# Patient Record
Sex: MALE | Race: OTHER | HISPANIC OR LATINO | URBAN - METROPOLITAN AREA
[De-identification: names, ages, dates, MRNs, and addresses within clinical notes are randomized per-mention and may not be internally consistent; named-entity substitution may affect disease eponyms.]

---

## 2019-02-08 ENCOUNTER — EMERGENCY (EMERGENCY)
Facility: HOSPITAL | Age: 29
LOS: 0 days | Discharge: HOME | End: 2019-02-08
Attending: EMERGENCY MEDICINE | Admitting: EMERGENCY MEDICINE
Payer: SELF-PAY

## 2019-02-08 VITALS
OXYGEN SATURATION: 100 % | HEART RATE: 59 BPM | DIASTOLIC BLOOD PRESSURE: 61 MMHG | RESPIRATION RATE: 18 BRPM | TEMPERATURE: 98 F | SYSTOLIC BLOOD PRESSURE: 129 MMHG

## 2019-02-08 VITALS — HEIGHT: 71 IN | WEIGHT: 190.04 LBS
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
## 2019-02-08 DIAGNOSIS — S39.012A STRAIN OF MUSCLE, FASCIA AND TENDON OF LOWER BACK, INITIAL ENCOUNTER: ICD-10-CM

## 2019-02-08 DIAGNOSIS — G89.11 ACUTE PAIN DUE TO TRAUMA: ICD-10-CM

## 2019-02-08 DIAGNOSIS — M25.512 PAIN IN LEFT SHOULDER: ICD-10-CM

## 2019-02-08 PROCEDURE — 99283 EMERGENCY DEPT VISIT LOW MDM: CPT

## 2019-02-08 RX ORDER — IBUPROFEN 200 MG
600 TABLET ORAL ONCE
Qty: 0 | Refills: 0 | Status: COMPLETED | OUTPATIENT
Start: 2019-02-08 | End: 2019-02-08

## 2019-02-08 RX ORDER — METHOCARBAMOL 500 MG/1
1 TABLET, FILM COATED ORAL
Qty: 12 | Refills: 0
Start: 2019-02-08 | End: 2019-02-11

## 2019-02-08 RX ORDER — IBUPROFEN 200 MG
1 TABLET ORAL
Qty: 12 | Refills: 0
Start: 2019-02-08 | End: 2019-02-10

## 2019-02-08 RX ORDER — METHOCARBAMOL 500 MG/1
500 TABLET, FILM COATED ORAL ONCE
Qty: 0 | Refills: 0 | Status: COMPLETED | OUTPATIENT
Start: 2019-02-08 | End: 2019-02-08

## 2019-02-08 RX ADMIN — Medication 600 MILLIGRAM(S): at 15:51

## 2019-02-08 RX ADMIN — METHOCARBAMOL 500 MILLIGRAM(S): 500 TABLET, FILM COATED ORAL at 15:51

## 2019-02-08 NOTE — ED STATDOCS - NS_ ATTENDINGSCRIBEDETAILS _ED_A_ED_FT
I, Edwin Parham MD,  performed the initial face to face bedside interview with this patient regarding history of present illness, review of symptoms and relevant past medical, social and family history.  I completed an independent physical examination.    The history, relevant review of systems, past medical and surgical history, medical decision making, and physical examination was documented by the scribe in my presence and I attest to the accuracy of the documentation.
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
## 2019-02-08 NOTE — ED STATDOCS - ATTENDING CONTRIBUTION TO CARE
I, Edwin Parham MD,  performed the initial face to face bedside interview with this patient regarding history of present illness, review of symptoms and relevant past medical, social and family history.  I completed an independent physical examination.  I was the initial provider who evaluated this patient. I have signed out the follow up of any pending tests (i.e. labs, radiological studies) to the ACP.  I have communicated the patient’s plan of care and disposition with the ACP.

## 2019-05-11 ENCOUNTER — EMERGENCY (EMERGENCY)
Facility: HOSPITAL | Age: 29
LOS: 0 days | Discharge: HOME | End: 2019-05-12
Attending: EMERGENCY MEDICINE | Admitting: EMERGENCY MEDICINE
Payer: MEDICAID

## 2019-05-11 VITALS
RESPIRATION RATE: 16 BRPM | DIASTOLIC BLOOD PRESSURE: 50 MMHG | HEART RATE: 51 BPM | TEMPERATURE: 98 F | SYSTOLIC BLOOD PRESSURE: 112 MMHG | OXYGEN SATURATION: 98 %

## 2019-05-11 DIAGNOSIS — R11.0 NAUSEA: ICD-10-CM

## 2019-05-11 DIAGNOSIS — R10.31 RIGHT LOWER QUADRANT PAIN: ICD-10-CM

## 2019-05-11 DIAGNOSIS — N50.819 TESTICULAR PAIN, UNSPECIFIED: ICD-10-CM

## 2019-05-11 PROCEDURE — 99284 EMERGENCY DEPT VISIT MOD MDM: CPT

## 2019-05-11 RX ORDER — ONDANSETRON 8 MG/1
4 TABLET, FILM COATED ORAL ONCE
Refills: 0 | Status: COMPLETED | OUTPATIENT
Start: 2019-05-11 | End: 2019-05-11

## 2019-05-11 RX ORDER — SODIUM CHLORIDE 9 MG/ML
1000 INJECTION INTRAMUSCULAR; INTRAVENOUS; SUBCUTANEOUS ONCE
Refills: 0 | Status: COMPLETED | OUTPATIENT
Start: 2019-05-11 | End: 2019-05-11

## 2019-05-11 RX ORDER — MORPHINE SULFATE 50 MG/1
2 CAPSULE, EXTENDED RELEASE ORAL ONCE
Refills: 0 | Status: DISCONTINUED | OUTPATIENT
Start: 2019-05-11 | End: 2019-05-11

## 2019-05-11 RX ADMIN — SODIUM CHLORIDE 1000 MILLILITER(S): 9 INJECTION INTRAMUSCULAR; INTRAVENOUS; SUBCUTANEOUS at 23:59

## 2019-05-11 NOTE — ED PROVIDER NOTE - NSFOLLOWUPINSTRUCTIONS_ED_ALL_ED_FT
Follow up with [facility] [phone]  Follow up with surgery for possible inguinal hernia   Avoid heavy lifting as much as possible  Buy a hernia belt for support  Return to ED for increased pain, fever, vomiting, or other concerns    Hernia    A hernia is when fat or the intestines push through a weak area in the abdominal wall. This can occur around the belly button (umbilical hernia) or where the leg meets the lower abdomen (inguinal hernia). This creates a rounded lump (bulge). Hernias that can be pushed back into the belly are called reducible and those that cannot are called incarcerated. Hernias that are not reducible and lose their blood supply are called strangulated and require emergency surgery. Hernias can be caused or worsened when straining during bowel movements or lifting heavy objects as well as coughing or sneezing. Surgery may be helpful in treating this condition so follow up with a surgeon.    SEEK IMMEDIATE MEDICAL CARE IF YOU HAVE ANY OF THE FOLLOWING SYMPTOMS: worsening abdominal pain, change in color over the hernia, nausea/vomiting, or inability to have a bowel movement or pass gas.

## 2019-05-11 NOTE — ED PROVIDER NOTE - GASTROINTESTINAL, MLM
Abdomen soft, right lower abdominal tenderness.  Mild fullness right inguinal region without discrete hernia palpable.  No r/g

## 2019-05-11 NOTE — ED PROVIDER NOTE - OBJECTIVE STATEMENT
27 yo male with no pmh c/o abdominal pain.  Pt c/o rlq pain, mild 4 days, barely noticable.  today with severe pain, nausea without vomiting.  Pain with urination and difficulty starting the stream.  Pain radiates to the back and the testicle at times.  Noticed some swelling in the right inguinal region but not in the testicle.  Does a lot of heavy lifting at work.  nonsmoker, Nondrinker.  No surgical hx.  denies h/o sTI and denies any exposures.

## 2019-05-11 NOTE — ED PROVIDER NOTE - CARE PROVIDER_API CALL
Mic Rahman)  Surgery  [address], [address]  [place] [address]  Phone: [phone]  Fax: [phone]  Follow Up Time:

## 2019-05-11 NOTE — ED PROVIDER NOTE - GENITOURINARY, MLM
No discharge, lesions; uncircumcised.  No testicular swelling or tenderness on exam. +cremasteric reflex bl

## 2019-05-12 VITALS
TEMPERATURE: 98 F | SYSTOLIC BLOOD PRESSURE: 120 MMHG | OXYGEN SATURATION: 99 % | RESPIRATION RATE: 18 BRPM | HEART RATE: 60 BPM | DIASTOLIC BLOOD PRESSURE: 48 MMHG

## 2019-05-12 LAB
ABO RH CONFIRMATION: SIGNIFICANT CHANGE UP
ALBUMIN SERPL ELPH-MCNC: 3.5 G/DL — SIGNIFICANT CHANGE UP (ref 3.3–5)
ALP SERPL-CCNC: 85 U/L — SIGNIFICANT CHANGE UP (ref 40–120)
ALT FLD-CCNC: 30 U/L — SIGNIFICANT CHANGE UP (ref 12–78)
ANION GAP SERPL CALC-SCNC: 5 MMOL/L — SIGNIFICANT CHANGE UP (ref 5–17)
APPEARANCE UR: ABNORMAL
APTT BLD: 31.9 SEC — SIGNIFICANT CHANGE UP (ref 27.5–36.3)
AST SERPL-CCNC: 30 U/L — SIGNIFICANT CHANGE UP (ref 15–37)
BACTERIA # UR AUTO: ABNORMAL
BASOPHILS # BLD AUTO: 0.06 K/UL — SIGNIFICANT CHANGE UP (ref 0–0.2)
BASOPHILS NFR BLD AUTO: 1 % — SIGNIFICANT CHANGE UP (ref 0–2)
BILIRUB SERPL-MCNC: 0.4 MG/DL — SIGNIFICANT CHANGE UP (ref 0.2–1.2)
BILIRUB UR-MCNC: NEGATIVE — SIGNIFICANT CHANGE UP
BLD GP AB SCN SERPL QL: SIGNIFICANT CHANGE UP
BUN SERPL-MCNC: 11 MG/DL — SIGNIFICANT CHANGE UP (ref 7–23)
CALCIUM SERPL-MCNC: 8.9 MG/DL — SIGNIFICANT CHANGE UP (ref 8.5–10.1)
CHLORIDE SERPL-SCNC: 108 MMOL/L — SIGNIFICANT CHANGE UP (ref 96–108)
CO2 SERPL-SCNC: 27 MMOL/L — SIGNIFICANT CHANGE UP (ref 22–31)
COLOR SPEC: YELLOW — SIGNIFICANT CHANGE UP
COMMENT - URINE: SIGNIFICANT CHANGE UP
CREAT SERPL-MCNC: 0.97 MG/DL — SIGNIFICANT CHANGE UP (ref 0.5–1.3)
DIFF PNL FLD: NEGATIVE — SIGNIFICANT CHANGE UP
EOSINOPHIL # BLD AUTO: 0.19 K/UL — SIGNIFICANT CHANGE UP (ref 0–0.5)
EOSINOPHIL NFR BLD AUTO: 3.2 % — SIGNIFICANT CHANGE UP (ref 0–6)
EPI CELLS # UR: SIGNIFICANT CHANGE UP
GLUCOSE SERPL-MCNC: 102 MG/DL — HIGH (ref 70–99)
GLUCOSE UR QL: NEGATIVE MG/DL — SIGNIFICANT CHANGE UP
HCT VFR BLD CALC: 39 % — SIGNIFICANT CHANGE UP (ref 39–50)
HGB BLD-MCNC: 13.4 G/DL — SIGNIFICANT CHANGE UP (ref 13–17)
IMM GRANULOCYTES NFR BLD AUTO: 0.2 % — SIGNIFICANT CHANGE UP (ref 0–1.5)
INR BLD: 1.09 RATIO — SIGNIFICANT CHANGE UP (ref 0.88–1.16)
KETONES UR-MCNC: ABNORMAL
LEUKOCYTE ESTERASE UR-ACNC: NEGATIVE — SIGNIFICANT CHANGE UP
LIDOCAIN IGE QN: 111 U/L — SIGNIFICANT CHANGE UP (ref 73–393)
LYMPHOCYTES # BLD AUTO: 2.51 K/UL — SIGNIFICANT CHANGE UP (ref 1–3.3)
LYMPHOCYTES # BLD AUTO: 42.6 % — SIGNIFICANT CHANGE UP (ref 13–44)
MCHC RBC-ENTMCNC: 30.8 PG — SIGNIFICANT CHANGE UP (ref 27–34)
MCHC RBC-ENTMCNC: 34.4 GM/DL — SIGNIFICANT CHANGE UP (ref 32–36)
MCV RBC AUTO: 89.7 FL — SIGNIFICANT CHANGE UP (ref 80–100)
MONOCYTES # BLD AUTO: 0.49 K/UL — SIGNIFICANT CHANGE UP (ref 0–0.9)
MONOCYTES NFR BLD AUTO: 8.3 % — SIGNIFICANT CHANGE UP (ref 2–14)
NEUTROPHILS # BLD AUTO: 2.63 K/UL — SIGNIFICANT CHANGE UP (ref 1.8–7.4)
NEUTROPHILS NFR BLD AUTO: 44.7 % — SIGNIFICANT CHANGE UP (ref 43–77)
NITRITE UR-MCNC: NEGATIVE — SIGNIFICANT CHANGE UP
NRBC # BLD: 0 /100 WBCS — SIGNIFICANT CHANGE UP (ref 0–0)
PH UR: 8 — SIGNIFICANT CHANGE UP (ref 5–8)
PLATELET # BLD AUTO: 250 K/UL — SIGNIFICANT CHANGE UP (ref 150–400)
POTASSIUM SERPL-MCNC: 3.9 MMOL/L — SIGNIFICANT CHANGE UP (ref 3.5–5.3)
POTASSIUM SERPL-SCNC: 3.9 MMOL/L — SIGNIFICANT CHANGE UP (ref 3.5–5.3)
PROT SERPL-MCNC: 6.9 GM/DL — SIGNIFICANT CHANGE UP (ref 6–8.3)
PROT UR-MCNC: NEGATIVE MG/DL — SIGNIFICANT CHANGE UP
PROTHROM AB SERPL-ACNC: 12.1 SEC — SIGNIFICANT CHANGE UP (ref 10–12.9)
RBC # BLD: 4.35 M/UL — SIGNIFICANT CHANGE UP (ref 4.2–5.8)
RBC # FLD: 12.7 % — SIGNIFICANT CHANGE UP (ref 10.3–14.5)
RBC CASTS # UR COMP ASSIST: SIGNIFICANT CHANGE UP /HPF (ref 0–4)
SODIUM SERPL-SCNC: 140 MMOL/L — SIGNIFICANT CHANGE UP (ref 135–145)
SP GR SPEC: 1.01 — SIGNIFICANT CHANGE UP (ref 1.01–1.02)
TYPE + AB SCN PNL BLD: SIGNIFICANT CHANGE UP
UROBILINOGEN FLD QL: NEGATIVE MG/DL — SIGNIFICANT CHANGE UP
WBC # BLD: 5.89 K/UL — SIGNIFICANT CHANGE UP (ref 3.8–10.5)
WBC # FLD AUTO: 5.89 K/UL — SIGNIFICANT CHANGE UP (ref 3.8–10.5)
WBC UR QL: SIGNIFICANT CHANGE UP

## 2019-05-12 PROCEDURE — 74177 CT ABD & PELVIS W/CONTRAST: CPT | Mod: 26

## 2019-05-12 RX ORDER — MORPHINE SULFATE 50 MG/1
4 CAPSULE, EXTENDED RELEASE ORAL ONCE
Refills: 0 | Status: DISCONTINUED | OUTPATIENT
Start: 2019-05-12 | End: 2019-05-12

## 2019-05-12 RX ADMIN — ONDANSETRON 4 MILLIGRAM(S): 8 TABLET, FILM COATED ORAL at 00:06

## 2019-05-12 RX ADMIN — MORPHINE SULFATE 4 MILLIGRAM(S): 50 CAPSULE, EXTENDED RELEASE ORAL at 02:49

## 2019-05-12 RX ADMIN — MORPHINE SULFATE 2 MILLIGRAM(S): 50 CAPSULE, EXTENDED RELEASE ORAL at 00:06

## 2019-05-12 RX ADMIN — SODIUM CHLORIDE 1000 MILLILITER(S): 9 INJECTION INTRAMUSCULAR; INTRAVENOUS; SUBCUTANEOUS at 01:01

## 2019-05-12 NOTE — ED ADULT NURSE NOTE - NSIMPLEMENTINTERV_GEN_ALL_ED
Implemented All Universal Safety Interventions:  [place] to call system. Call bell, personal items and telephone within reach. Instruct patient to call for assistance. Room bathroom lighting operational. Non-slip footwear when patient is off stretcher. Physically safe environment: no spills, clutter or unnecessary equipment. Stretcher in lowest position, wheels locked, appropriate side rails in place.

## 2019-05-12 NOTE — ED ADULT NURSE REASSESSMENT NOTE - NS ED NURSE REASSESS COMMENT FT1
pt states groin pain/abd pain improved s/p morphine, now with abd pain again but has become more diffuse- denies n/v Awaiting CT results. Will monitor.

## 2019-05-13 LAB
N GONORRHOEA RRNA SPEC QL NAA+PROBE: SIGNIFICANT CHANGE UP
SPECIMEN SOURCE: SIGNIFICANT CHANGE UP

## 2019-06-11 ENCOUNTER — TRANSCRIPTION ENCOUNTER (OUTPATIENT)
Age: 29
End: 2019-06-11

## 2019-06-11 ENCOUNTER — INPATIENT (INPATIENT)
Facility: HOSPITAL | Age: 29
LOS: 0 days | Discharge: HOME | End: 2019-06-12
Attending: SURGERY | Admitting: SURGERY
Payer: MEDICAID

## 2019-06-11 VITALS
OXYGEN SATURATION: 100 % | DIASTOLIC BLOOD PRESSURE: 87 MMHG | SYSTOLIC BLOOD PRESSURE: 136 MMHG | TEMPERATURE: 98 F | HEART RATE: 69 BPM | HEIGHT: 71 IN | WEIGHT: 195.11 LBS | RESPIRATION RATE: 18 BRPM

## 2019-06-11 DIAGNOSIS — K37 UNSPECIFIED APPENDICITIS: ICD-10-CM

## 2019-06-11 LAB
ALBUMIN SERPL ELPH-MCNC: 4.1 G/DL — SIGNIFICANT CHANGE UP (ref 3.3–5)
ALP SERPL-CCNC: 79 U/L — SIGNIFICANT CHANGE UP (ref 40–120)
ALT FLD-CCNC: 19 U/L — SIGNIFICANT CHANGE UP (ref 12–78)
ANION GAP SERPL CALC-SCNC: 5 MMOL/L — SIGNIFICANT CHANGE UP (ref 5–17)
APTT BLD: 29 SEC — SIGNIFICANT CHANGE UP (ref 27.5–36.3)
AST SERPL-CCNC: 19 U/L — SIGNIFICANT CHANGE UP (ref 15–37)
BASOPHILS # BLD AUTO: 0.05 K/UL — SIGNIFICANT CHANGE UP (ref 0–0.2)
BASOPHILS NFR BLD AUTO: 0.4 % — SIGNIFICANT CHANGE UP (ref 0–2)
BILIRUB SERPL-MCNC: 0.5 MG/DL — SIGNIFICANT CHANGE UP (ref 0.2–1.2)
BLD GP AB SCN SERPL QL: SIGNIFICANT CHANGE UP
BUN SERPL-MCNC: 14 MG/DL — SIGNIFICANT CHANGE UP (ref 7–23)
CALCIUM SERPL-MCNC: 9.4 MG/DL — SIGNIFICANT CHANGE UP (ref 8.5–10.1)
CHLORIDE SERPL-SCNC: 106 MMOL/L — SIGNIFICANT CHANGE UP (ref 96–108)
CO2 SERPL-SCNC: 29 MMOL/L — SIGNIFICANT CHANGE UP (ref 22–31)
CREAT SERPL-MCNC: 1.06 MG/DL — SIGNIFICANT CHANGE UP (ref 0.5–1.3)
EOSINOPHIL # BLD AUTO: 0.24 K/UL — SIGNIFICANT CHANGE UP (ref 0–0.5)
EOSINOPHIL NFR BLD AUTO: 2.1 % — SIGNIFICANT CHANGE UP (ref 0–6)
GLUCOSE SERPL-MCNC: 97 MG/DL — SIGNIFICANT CHANGE UP (ref 70–99)
HCT VFR BLD CALC: 44.8 % — SIGNIFICANT CHANGE UP (ref 39–50)
HGB BLD-MCNC: 15.1 G/DL — SIGNIFICANT CHANGE UP (ref 13–17)
IMM GRANULOCYTES NFR BLD AUTO: 0.3 % — SIGNIFICANT CHANGE UP (ref 0–1.5)
INR BLD: 1.07 RATIO — SIGNIFICANT CHANGE UP (ref 0.88–1.16)
LYMPHOCYTES # BLD AUTO: 1.38 K/UL — SIGNIFICANT CHANGE UP (ref 1–3.3)
LYMPHOCYTES # BLD AUTO: 11.8 % — LOW (ref 13–44)
MCHC RBC-ENTMCNC: 30.6 PG — SIGNIFICANT CHANGE UP (ref 27–34)
MCHC RBC-ENTMCNC: 33.7 GM/DL — SIGNIFICANT CHANGE UP (ref 32–36)
MCV RBC AUTO: 90.9 FL — SIGNIFICANT CHANGE UP (ref 80–100)
MONOCYTES # BLD AUTO: 0.94 K/UL — HIGH (ref 0–0.9)
MONOCYTES NFR BLD AUTO: 8 % — SIGNIFICANT CHANGE UP (ref 2–14)
NEUTROPHILS # BLD AUTO: 9.05 K/UL — HIGH (ref 1.8–7.4)
NEUTROPHILS NFR BLD AUTO: 77.4 % — HIGH (ref 43–77)
PLATELET # BLD AUTO: 243 K/UL — SIGNIFICANT CHANGE UP (ref 150–400)
POTASSIUM SERPL-MCNC: 3.8 MMOL/L — SIGNIFICANT CHANGE UP (ref 3.5–5.3)
POTASSIUM SERPL-SCNC: 3.8 MMOL/L — SIGNIFICANT CHANGE UP (ref 3.5–5.3)
PROT SERPL-MCNC: 7.9 GM/DL — SIGNIFICANT CHANGE UP (ref 6–8.3)
PROTHROM AB SERPL-ACNC: 11.9 SEC — SIGNIFICANT CHANGE UP (ref 10–12.9)
RBC # BLD: 4.93 M/UL — SIGNIFICANT CHANGE UP (ref 4.2–5.8)
RBC # FLD: 12.4 % — SIGNIFICANT CHANGE UP (ref 10.3–14.5)
SODIUM SERPL-SCNC: 140 MMOL/L — SIGNIFICANT CHANGE UP (ref 135–145)
TYPE + AB SCN PNL BLD: SIGNIFICANT CHANGE UP
WBC # BLD: 11.7 K/UL — HIGH (ref 3.8–10.5)
WBC # FLD AUTO: 11.7 K/UL — HIGH (ref 3.8–10.5)

## 2019-06-11 PROCEDURE — 99285 EMERGENCY DEPT VISIT HI MDM: CPT | Mod: 25

## 2019-06-11 PROCEDURE — 88304 TISSUE EXAM BY PATHOLOGIST: CPT | Mod: 26

## 2019-06-11 RX ORDER — CEFOTETAN DISODIUM 1 G
2 VIAL (EA) INJECTION ONCE
Refills: 0 | Status: COMPLETED | OUTPATIENT
Start: 2019-06-11 | End: 2019-06-11

## 2019-06-11 RX ORDER — MORPHINE SULFATE 50 MG/1
2 CAPSULE, EXTENDED RELEASE ORAL EVERY 4 HOURS
Refills: 0 | Status: DISCONTINUED | OUTPATIENT
Start: 2019-06-11 | End: 2019-06-11

## 2019-06-11 RX ORDER — ENOXAPARIN SODIUM 100 MG/ML
30 INJECTION SUBCUTANEOUS DAILY
Refills: 0 | Status: DISCONTINUED | OUTPATIENT
Start: 2019-06-11 | End: 2019-06-12

## 2019-06-11 RX ORDER — SODIUM CHLORIDE 9 MG/ML
1000 INJECTION INTRAMUSCULAR; INTRAVENOUS; SUBCUTANEOUS
Refills: 0 | Status: DISCONTINUED | OUTPATIENT
Start: 2019-06-11 | End: 2019-06-11

## 2019-06-11 RX ORDER — MORPHINE SULFATE 50 MG/1
4 CAPSULE, EXTENDED RELEASE ORAL ONCE
Refills: 0 | Status: DISCONTINUED | OUTPATIENT
Start: 2019-06-11 | End: 2019-06-11

## 2019-06-11 RX ORDER — SODIUM CHLORIDE 9 MG/ML
1000 INJECTION, SOLUTION INTRAVENOUS
Refills: 0 | Status: DISCONTINUED | OUTPATIENT
Start: 2019-06-11 | End: 2019-06-11

## 2019-06-11 RX ORDER — OXYCODONE AND ACETAMINOPHEN 5; 325 MG/1; MG/1
1 TABLET ORAL
Qty: 20 | Refills: 0
Start: 2019-06-11

## 2019-06-11 RX ORDER — OXYCODONE AND ACETAMINOPHEN 5; 325 MG/1; MG/1
2 TABLET ORAL EVERY 4 HOURS
Refills: 0 | Status: DISCONTINUED | OUTPATIENT
Start: 2019-06-11 | End: 2019-06-12

## 2019-06-11 RX ORDER — OXYCODONE AND ACETAMINOPHEN 5; 325 MG/1; MG/1
1 TABLET ORAL EVERY 4 HOURS
Refills: 0 | Status: DISCONTINUED | OUTPATIENT
Start: 2019-06-11 | End: 2019-06-12

## 2019-06-11 RX ORDER — ACETAMINOPHEN 500 MG
1000 TABLET ORAL ONCE
Refills: 0 | Status: COMPLETED | OUTPATIENT
Start: 2019-06-11 | End: 2019-06-11

## 2019-06-11 RX ORDER — HYDROMORPHONE HYDROCHLORIDE 2 MG/ML
0.5 INJECTION INTRAMUSCULAR; INTRAVENOUS; SUBCUTANEOUS
Refills: 0 | Status: DISCONTINUED | OUTPATIENT
Start: 2019-06-11 | End: 2019-06-11

## 2019-06-11 RX ORDER — ONDANSETRON 8 MG/1
4 TABLET, FILM COATED ORAL ONCE
Refills: 0 | Status: DISCONTINUED | OUTPATIENT
Start: 2019-06-11 | End: 2019-06-11

## 2019-06-11 RX ORDER — ONDANSETRON 8 MG/1
4 TABLET, FILM COATED ORAL ONCE
Refills: 0 | Status: COMPLETED | OUTPATIENT
Start: 2019-06-11 | End: 2019-06-11

## 2019-06-11 RX ORDER — OXYCODONE HYDROCHLORIDE 5 MG/1
5 TABLET ORAL ONCE
Refills: 0 | Status: DISCONTINUED | OUTPATIENT
Start: 2019-06-11 | End: 2019-06-11

## 2019-06-11 RX ORDER — MEPERIDINE HYDROCHLORIDE 50 MG/ML
12.5 INJECTION INTRAMUSCULAR; INTRAVENOUS; SUBCUTANEOUS
Refills: 0 | Status: DISCONTINUED | OUTPATIENT
Start: 2019-06-11 | End: 2019-06-11

## 2019-06-11 RX ORDER — ONDANSETRON 8 MG/1
4 TABLET, FILM COATED ORAL EVERY 6 HOURS
Refills: 0 | Status: DISCONTINUED | OUTPATIENT
Start: 2019-06-11 | End: 2019-06-12

## 2019-06-11 RX ADMIN — OXYCODONE AND ACETAMINOPHEN 2 TABLET(S): 5; 325 TABLET ORAL at 13:15

## 2019-06-11 RX ADMIN — OXYCODONE AND ACETAMINOPHEN 2 TABLET(S): 5; 325 TABLET ORAL at 23:06

## 2019-06-11 RX ADMIN — OXYCODONE HYDROCHLORIDE 5 MILLIGRAM(S): 5 TABLET ORAL at 07:13

## 2019-06-11 RX ADMIN — Medication 100 GRAM(S): at 07:05

## 2019-06-11 RX ADMIN — Medication 400 MILLIGRAM(S): at 07:03

## 2019-06-11 RX ADMIN — OXYCODONE AND ACETAMINOPHEN 2 TABLET(S): 5; 325 TABLET ORAL at 22:09

## 2019-06-11 RX ADMIN — ONDANSETRON 4 MILLIGRAM(S): 8 TABLET, FILM COATED ORAL at 10:59

## 2019-06-11 RX ADMIN — MORPHINE SULFATE 4 MILLIGRAM(S): 50 CAPSULE, EXTENDED RELEASE ORAL at 03:20

## 2019-06-11 RX ADMIN — SODIUM CHLORIDE 125 MILLILITER(S): 9 INJECTION INTRAMUSCULAR; INTRAVENOUS; SUBCUTANEOUS at 03:05

## 2019-06-11 RX ADMIN — OXYCODONE AND ACETAMINOPHEN 2 TABLET(S): 5; 325 TABLET ORAL at 12:31

## 2019-06-11 RX ADMIN — MORPHINE SULFATE 2 MILLIGRAM(S): 50 CAPSULE, EXTENDED RELEASE ORAL at 07:04

## 2019-06-11 RX ADMIN — ONDANSETRON 4 MILLIGRAM(S): 8 TABLET, FILM COATED ORAL at 03:05

## 2019-06-11 RX ADMIN — Medication 1000 MILLIGRAM(S): at 07:03

## 2019-06-11 RX ADMIN — ENOXAPARIN SODIUM 30 MILLIGRAM(S): 100 INJECTION SUBCUTANEOUS at 12:33

## 2019-06-11 RX ADMIN — MORPHINE SULFATE 4 MILLIGRAM(S): 50 CAPSULE, EXTENDED RELEASE ORAL at 03:06

## 2019-06-11 NOTE — BRIEF OPERATIVE NOTE - NSICDXBRIEFPREOP_GEN_ALL_CORE_FT
PRE-OP DIAGNOSIS:  Acute appendicitis with localized peritonitis 11-Jun-2019 06:40:26  Yuriy Chavira

## 2019-06-11 NOTE — ED ADULT NURSE NOTE - OBJECTIVE STATEMENT
Pt comes in complaining of right sided abd pain 8/10 and nausea/vomiting. Pt states that pain has been going on since being dx with appendicitis but got so bad tonight he had to come to ER. Pt denies chest pain, fevers, sob. Pt has surgery scheduled for 6/26/19.

## 2019-06-11 NOTE — DISCHARGE NOTE PROVIDER - NSDCCPCAREPLAN_GEN_ALL_CORE_FT
PRINCIPAL DISCHARGE DIAGNOSIS  Diagnosis: Appendicitis, unspecified appendicitis type  Assessment and Plan of Treatment:

## 2019-06-11 NOTE — PATIENT PROFILE ADULT - BRADEN NUTRITION
Talked to mom offered appt 0240p she wanted 0340p had patients already booked offered 1030a today she refused child was co  
(2) probably inadequate

## 2019-06-11 NOTE — DISCHARGE NOTE PROVIDER - CARE PROVIDER_API CALL
Yuriy Chavira)  Surgery  [address], [address]  [place] [address]  Phone: [phone]  Fax: [phone]  Follow Up Time:

## 2019-06-11 NOTE — ED ADULT TRIAGE NOTE - CHIEF COMPLAINT QUOTE
abd pain with nausea  .vomiting ,feels cold today  dx appendicitis by DR Rahman  pt  surgery schedule on 6/26/2019

## 2019-06-11 NOTE — PROGRESS NOTE ADULT - SUBJECTIVE AND OBJECTIVE BOX
Post Op Day#: 1  Procedure:  Laparoscopic appendectomy    The patient is doing well without complaints.    Vital Signs Last 24 Hrs  T(C): 37.1 (11 Jun 2019 08:16), Max: 37.1 (11 Jun 2019 08:16)  T(F): 98.7 (11 Jun 2019 08:16), Max: 98.7 (11 Jun 2019 08:16)  HR: 60 (11 Jun 2019 08:16) (60 - 81)  BP: 118/61 (11 Jun 2019 08:16) (106/43 - 140/70)  BP(mean): --  RR: 16 (11 Jun 2019 08:16) (16 - 19)  SpO2: 100% (11 Jun 2019 08:16) (98% - 100%)    PHYSICAL EXAM:  General: NAD.  HEENT: no JVD, no jaundice.  LUNGS: CTAB.  Heart: S1 S2 RRR  Abd: soft nt/nd   Wounds: clean dry and intact                          15.1   11.70 )-----------( 243      ( 11 Jun 2019 02:58 )             44.8       06-11    140  |  106  |  14  ----------------------------<  97  3.8   |  29  |  1.06    Ca    9.4      11 Jun 2019 02:58    TPro  7.9  /  Alb  4.1  /  TBili  0.5  /  DBili  x   /  AST  19  /  ALT  19  /  AlkPhos  79  06-11      PT/INR - ( 11 Jun 2019 02:58 )   PT: 11.9 sec;   INR: 1.07 ratio         PTT - ( 11 Jun 2019 02:58 )  PTT:29.0 sec

## 2019-06-11 NOTE — PROGRESS NOTE ADULT - PROBLEM SELECTOR PLAN 1
- Adv. diet to regular  - Pain control  - Ambulate  - DVT ppx  - IV lock  - Dispo home if tolerating diet, pain controlled with oral medication, and no problems voiding

## 2019-06-11 NOTE — ED PROVIDER NOTE - OBJECTIVE STATEMENT
29 y/o male in ED c/o worsening RLQ pain x 1 month.   pt states was diagnosed with appendolith on 5/12/19 and scheduled for surgery on 6/26/19 but pain worse today.   pt denies any fever, HA, cp, sob, diarrhea.   states also with n/v.   no sick contacts or recent travel   no meds taken for pain

## 2019-06-11 NOTE — BRIEF OPERATIVE NOTE - NSICDXBRIEFPOSTOP_GEN_ALL_CORE_FT
POST-OP DIAGNOSIS:  Acute appendicitis with localized peritonitis 11-Jun-2019 06:40:37  Yuriy Chavira

## 2019-06-11 NOTE — DISCHARGE NOTE PROVIDER - HOSPITAL COURSE
Patient is an 29 yo M admitted with abdominal pain secondary to acute appendicitis. Patient underwent laparoscopic appendectomy without any complications. Patient is currently doing very well, pain controlled with oral medication, and is tolerating regular diet. Patient has had an uncomplicated hospital course and is stable for discharge home with follow-up in office in 2 weeks.

## 2019-06-11 NOTE — H&P ADULT - HISTORY OF PRESENT ILLNESS
29 y/o male in ED c/o worsening RLQ pain x 1 month. Patient states was diagnosed with appendolith on 5/12/19 and scheduled for surgery on 6/26/19 but pain worse today. Pain is associated with fevers, nausea, and vomiting.

## 2019-06-12 VITALS
TEMPERATURE: 98 F | DIASTOLIC BLOOD PRESSURE: 52 MMHG | HEART RATE: 55 BPM | RESPIRATION RATE: 16 BRPM | OXYGEN SATURATION: 98 % | SYSTOLIC BLOOD PRESSURE: 106 MMHG

## 2019-06-12 LAB — SURGICAL PATHOLOGY STUDY: SIGNIFICANT CHANGE UP

## 2019-06-12 RX ADMIN — OXYCODONE AND ACETAMINOPHEN 2 TABLET(S): 5; 325 TABLET ORAL at 10:58

## 2019-06-12 RX ADMIN — OXYCODONE AND ACETAMINOPHEN 2 TABLET(S): 5; 325 TABLET ORAL at 07:07

## 2019-06-12 RX ADMIN — ENOXAPARIN SODIUM 30 MILLIGRAM(S): 100 INJECTION SUBCUTANEOUS at 10:59

## 2019-06-12 RX ADMIN — OXYCODONE AND ACETAMINOPHEN 2 TABLET(S): 5; 325 TABLET ORAL at 11:28

## 2019-06-12 RX ADMIN — OXYCODONE AND ACETAMINOPHEN 2 TABLET(S): 5; 325 TABLET ORAL at 06:35

## 2019-06-12 NOTE — PROGRESS NOTE ADULT - SUBJECTIVE AND OBJECTIVE BOX
Post Op Day#: 1  Procedure:  Laparoscopic appendectomy    The patient is doing well without complaints.    Vital Signs Last 24 Hrs  T(C): 36.8 (12 Jun 2019 05:10), Max: 37.2 (11 Jun 2019 11:33)  T(F): 98.3 (12 Jun 2019 05:10), Max: 99 (11 Jun 2019 11:33)  HR: 54 (12 Jun 2019 05:10) (51 - 63)  BP: 111/61 (12 Jun 2019 05:10) (106/39 - 111/61)  BP(mean): --  RR: 16 (12 Jun 2019 05:10) (16 - 16)  SpO2: 97% (12 Jun 2019 05:10) (97% - 100%)    PHYSICAL EXAM:  General: NAD.  HEENT: no JVD, no jaundice.  LUNGS: CTAB.  Heart: S1 S2 RRR  Abd: soft nt/nd   Wounds: clean dry and intact                          15.1   11.70 )-----------( 243      ( 11 Jun 2019 02:58 )             44.8       06-11    140  |  106  |  14  ----------------------------<  97  3.8   |  29  |  1.06    Ca    9.4      11 Jun 2019 02:58    TPro  7.9  /  Alb  4.1  /  TBili  0.5  /  DBili  x   /  AST  19  /  ALT  19  /  AlkPhos  79  06-11      PT/INR - ( 11 Jun 2019 02:58 )   PT: 11.9 sec;   INR: 1.07 ratio         PTT - ( 11 Jun 2019 02:58 )  PTT:29.0 sec

## 2019-06-12 NOTE — PROGRESS NOTE ADULT - PROBLEM SELECTOR PLAN 1
The patient is s/p lap appendectomy and doing very well.  All discharge instructions were given to the patient, as well as potential signs of complications.  The patient will follow up in 2 weeks.  [facility]

## 2019-06-17 DIAGNOSIS — K38.1 APPENDICULAR CONCRETIONS: ICD-10-CM

## 2019-06-17 DIAGNOSIS — K35.80 UNSPECIFIED ACUTE APPENDICITIS: ICD-10-CM

## 2019-11-05 NOTE — ED ADULT NURSE NOTE - NSHOSCREENINGQ1_ED_ALL_ED
[FreeTextEntry1] : 76 year old male with a history of [place] 7 (3+4) prostate cancer, T2c, status post radical prostatectomy in 2005 at PSA of 5.6. Current PSA is 0.1 and stable. No urinary issues. As per ED, patient will restart to take Sildenafil. Patient instructed to take 1 hour before sexual intercourse. Patient instructed to start Sildenafil 20 mg and can increase by 20 mg every 2 days until satisfactory results. Patient understands the risks of the medication including flushing, headache, nasal congestion, nausea, muscle aches, priapism, chest pain,MI, decrease in blood pressure, and changes in vision. Patient is to go the ER if painful erection lasting longer than 3 hours. He will f/u in 1 year with a new PSA.\par \par \par \par  No

## 2021-04-13 ENCOUNTER — OUTPATIENT (OUTPATIENT)
Dept: OUTPATIENT SERVICES | Facility: HOSPITAL | Age: 31
LOS: 1 days | End: 2021-04-13
Payer: COMMERCIAL

## 2021-04-13 DIAGNOSIS — Z20.828 CONTACT WITH AND (SUSPECTED) EXPOSURE TO OTHER VIRAL COMMUNICABLE DISEASES: ICD-10-CM

## 2021-04-13 LAB — SARS-COV-2 RNA SPEC QL NAA+PROBE: SIGNIFICANT CHANGE UP

## 2021-04-13 PROCEDURE — C9803: CPT

## 2021-04-13 PROCEDURE — U0005: CPT

## 2021-04-13 PROCEDURE — U0003: CPT

## 2021-04-14 DIAGNOSIS — Z20.828 CONTACT WITH AND (SUSPECTED) EXPOSURE TO OTHER VIRAL COMMUNICABLE DISEASES: ICD-10-CM

## 2022-01-25 ENCOUNTER — EMERGENCY (EMERGENCY)
Facility: HOSPITAL | Age: 32
LOS: 0 days | Discharge: HOME | End: 2022-01-25
Attending: EMERGENCY MEDICINE
Payer: COMMERCIAL

## 2022-01-25 VITALS — WEIGHT: 199.96 LBS | HEIGHT: 71 IN

## 2022-01-25 VITALS
RESPIRATION RATE: 20 BRPM | SYSTOLIC BLOOD PRESSURE: 121 MMHG | OXYGEN SATURATION: 100 % | DIASTOLIC BLOOD PRESSURE: 67 MMHG | TEMPERATURE: 98 F | HEART RATE: 70 BPM

## 2022-01-25 DIAGNOSIS — M54.41 LUMBAGO WITH SCIATICA, RIGHT SIDE: ICD-10-CM

## 2022-01-25 PROCEDURE — 96375 TX/PRO/DX INJ NEW DRUG ADDON: CPT

## 2022-01-25 PROCEDURE — 99284 EMERGENCY DEPT VISIT MOD MDM: CPT | Mod: 25

## 2022-01-25 PROCEDURE — 99284 EMERGENCY DEPT VISIT MOD MDM: CPT

## 2022-01-25 PROCEDURE — 96374 THER/PROPH/DIAG INJ IV PUSH: CPT

## 2022-01-25 RX ORDER — CYCLOBENZAPRINE HYDROCHLORIDE 10 MG/1
1 TABLET, FILM COATED ORAL
Qty: 15 | Refills: 0
Start: 2022-01-25

## 2022-01-25 RX ORDER — DEXAMETHASONE 0.5 MG/5ML
6 ELIXIR ORAL ONCE
Refills: 0 | Status: COMPLETED | OUTPATIENT
Start: 2022-01-25 | End: 2022-01-25

## 2022-01-25 RX ORDER — CYCLOBENZAPRINE HYDROCHLORIDE 10 MG/1
10 TABLET, FILM COATED ORAL ONCE
Refills: 0 | Status: COMPLETED | OUTPATIENT
Start: 2022-01-25 | End: 2022-01-25

## 2022-01-25 RX ORDER — ONDANSETRON 8 MG/1
4 TABLET, FILM COATED ORAL ONCE
Refills: 0 | Status: COMPLETED | OUTPATIENT
Start: 2022-01-25 | End: 2022-01-25

## 2022-01-25 RX ORDER — MORPHINE SULFATE 50 MG/1
4 CAPSULE, EXTENDED RELEASE ORAL ONCE
Refills: 0 | Status: DISCONTINUED | OUTPATIENT
Start: 2022-01-25 | End: 2022-01-25

## 2022-01-25 RX ORDER — IBUPROFEN 200 MG
1 TABLET ORAL
Qty: 30 | Refills: 0
Start: 2022-01-25

## 2022-01-25 RX ADMIN — MORPHINE SULFATE 4 MILLIGRAM(S): 50 CAPSULE, EXTENDED RELEASE ORAL at 15:41

## 2022-01-25 RX ADMIN — CYCLOBENZAPRINE HYDROCHLORIDE 10 MILLIGRAM(S): 10 TABLET, FILM COATED ORAL at 15:42

## 2022-01-25 RX ADMIN — Medication 6 MILLIGRAM(S): at 15:41

## 2022-01-25 RX ADMIN — ONDANSETRON 4 MILLIGRAM(S): 8 TABLET, FILM COATED ORAL at 15:56

## 2022-01-25 NOTE — ED STATDOCS - MUSCULOSKELETAL, MLM
range of motion is not limited and there is no muscle tenderness. TTP right lumbar spine. Distal NVM intact. SLR positive when lifting left leg. TTP right lumbar spine. Distal NVM intact. SLR positive when lifting contralateral leg (left).

## 2022-01-25 NOTE — ED STATDOCS - ENMT, MLM
Nasal mucosa clear.  Mouth with normal mucosa  Throat has no vesicles, no oropharyngeal exudates and uvula is midline. Airway patent, face mask in place

## 2022-01-25 NOTE — ED STATDOCS - CARE PROVIDER_API CALL
Camelia Alarcon)  Orthopaedic Surgery  [address] [place] [address], [address] [place] [address]  Phone: [phone]  Fax: [phone]  Follow Up Time:

## 2022-01-25 NOTE — ED STATDOCS - ATTENDING CONTRIBUTION TO CARE
I, Kolton Larose MD,  performed the initial face to face bedside interview with this patient regarding history of present illness, review of symptoms and relevant past medical, social and family history.  I completed an independent physical examination.  I was the initial provider who evaluated this patient.   I personally saw the patient and performed a substantive portion of the visit including all aspects of the medical decision making.  I have signed out the follow up of any pending tests (i.e. labs, radiological studies) to the ACP.  I have communicated the patient’s plan of care and disposition with the ACP.  The history, relevant review of systems, past medical and surgical history, medical decision making, and physical examination was documented by the scribe in my presence and I attest to the accuracy of the documentation.

## 2022-01-25 NOTE — ED STATDOCS - NSFOLLOWUPINSTRUCTIONS_ED_ALL_ED_FT
Lower Back Exercises    WHAT YOU NEED TO KNOW:    Lower back exercises help heal and strengthen your back muscles to prevent another injury. Ask your healthcare provider if you need to see a physical therapist for more advanced exercises.     DISCHARGE INSTRUCTIONS:    Return to the emergency department if:   •You have severe pain that prevents you from moving.           Contact your healthcare provider if:   •Your pain becomes worse.      •You have new pain.      •You have questions or concerns about your condition or care.      Do lower back exercises safely:   •Do the exercises on a mat or firm surface (not on a bed) to support your spine and prevent low back pain.       •Move slowly and smoothly. Avoid fast or jerky motions.       •Breathe normally. Do not hold your breath.       •Stop if you feel pain. It is normal to feel some discomfort at first. Regular exercise will help decrease your discomfort over time.      Lower back exercises: Your healthcare provider may recommend that you do back exercises 10 to 30 minutes each day. He may also recommend that you do exercises 1 to 3 times each day. Ask your healthcare provider which exercises are best for you and how often to do them.  •Ankle pumps: Lie on your back. Move your foot up (with your toes pointing toward your head). Then, move your foot down (with your toes pointing away from you). Repeat this exercise 10 times on each side.  Ankle Pumps           •Heel slides: Lie on your back. Slowly bend one leg and then straighten it. Next, bend the other leg and then straighten it. Repeat 10 times on each side.   Heel Slides           •Pelvic tilt: Lie on your back with your knees bent and feet flat on the floor. Place your arms in a relaxed position beside your body. Tighten the muscles of your abdomen and flatten your back against the floor. Hold for 5 seconds. Repeat 5 times.  Pelvic Tilt           •Back stretch: Lie on your back with your hands behind your head. Bend your knees and turn the lower half of your body to one side. Hold this position for 10 seconds. Repeat 3 times on each side.   Back Stretch           •Straight leg raises: Lie on your back with one leg straight. Bend the other knee. Tighten your abdomen and then slowly lift the straight leg up about 6 to 12 inches off the floor. Hold for 1 to 5 seconds. Lower your leg slowly. Repeat 10 times on each leg.  Straight Leg Raises           •Knee-to-chest: Lie on your back with your knees bent and feet flat on the floor. Pull one of your knees toward your chest and hold it there for 5 seconds. Return your leg to the starting position. Lift the other knee toward your chest and hold for 5 seconds. Do this 5 times on each side.   Knee to Chest           •Cat and camel: Place your hands and knees on the floor. Arch your back upward toward the ceiling and lower your head. Round out your spine as much as you can. Hold for 5 seconds. Lift your head upward and push your chest downward toward the floor. Hold for 5 seconds. Do 3 sets or as directed.  Cat and Camel Exercise           •Wall squats: Stand with your back against a wall. Tighten the muscles of your abdomen. Slowly lower your body until your knees are bent at a 45 degree angle. Hold this position for 5 seconds. Slowly move back up to a standing position. Repeat 10 times.  STANDING HALF SQUATS           •Curl up: Lie on your back with your knees bent and feet flat on the floor. Place your hands, palms down, underneath the curve in your lower back. Next, with your elbows on the floor, lift your shoulders and chest 2 to 3 inches. Keep your head in line with your shoulders. Hold this position for 5 seconds. When you can do this exercise without pain for 10 to 15 seconds, you may add a rotation. While your shoulders and chest are lifted off the ground, turn slightly to the left and hold. Repeat on the other side.  Abdominal Curl Up           •Bird dog: Place your hands and knees on the floor. Keep your wrists directly below your shoulders and your knees directly below your hips. Pull your belly button in toward your spine. Do not flatten or arch your back. Tighten your abdominal muscles. Raise one arm straight out so that it is aligned with your head. Next, raise the leg opposite your arm. Hold this position for 15 seconds. Lower your arm and leg slowly and change sides. Do 5 sets.   Bird Dog Exercise              © Copyright [employer] 2022

## 2022-01-25 NOTE — ED STATDOCS - NSICDXNOFAMILYHX_GEN_ALL_ED
Quiet, but awake. FLACC 0. He is restless intermittently. RR 20. <-- Click to add NO pertinent Family History

## 2022-01-25 NOTE — ED STATDOCS - PATIENT PORTAL LINK FT
You can access the FollowMyHealth Patient Portal offered by [facility] by registering at the following website: [email or web address]. By joining [employer]’s FollowMyHealth portal, you will also be able to view your health information using other applications (apps) compatible with our system.

## 2022-01-25 NOTE — ED STATDOCS - PROGRESS NOTE DETAILS
30 y/o M with no PMH presents with right sided back pain radiating into his right leg x 2 weeks. Denies trauma. States he's tried multiple remedies at home for pain relief with no improvement including advil, topical IcyHot. Denies heavy lifting, IVDU, fever, nausea, vomiting, numbness/tingling in extremities, loss of bowel/bladder control. PE: Well appearing. Cardiac: s1s2, RRR. Lungs: CTAB. MSK: No midline spinal tenderness. +right paraspinal tenderness. +SLR right and left. Neuro; Sensation intact to light touch in all extremities. 5/5 strength in all extremities. A/P: Sciatica, plan for analgesia, dc home with spine follow up. - PRAVEEN RosaC

## 2022-01-25 NOTE — ED STATDOCS - OBJECTIVE STATEMENT
30 y/o male with no significant PMHx presents to the ED c/o lower back pain x2 weeks, worsening. No trauma or injury. Pain radiates to RLE. Denies night sweats, weight loss, urinary symptoms. Pt took Advil at home without improvement. Nonsmoker. No drug use. No other complaints at this time.

## 2022-01-25 NOTE — ED ADULT TRIAGE NOTE - CHIEF COMPLAINT QUOTE
c/o lower back pain started 2 week ago, getting worse, denies acute injury, denies urinary symptoms, denies taking pain relieving medication, denies fever HX: denies

## 2022-02-11 NOTE — ED PROVIDER NOTE - LOCATION
abdomen DVT PPx  -SCDs DVT PPx  -SCDs DVT PPx  -SCDs DVT PPx  -SCDs DVT PPx  -SCDs DVT PPx  -SCDs DVT PPx  -SCDs DVT PPx  -SCDs

## 2023-05-30 ENCOUNTER — EMERGENCY (EMERGENCY)
Facility: HOSPITAL | Age: 33
LOS: 0 days | Discharge: HOME | End: 2023-05-30
Attending: EMERGENCY MEDICINE
Payer: COMMERCIAL

## 2023-05-30 VITALS
OXYGEN SATURATION: 100 % | SYSTOLIC BLOOD PRESSURE: 112 MMHG | WEIGHT: 177.91 LBS | DIASTOLIC BLOOD PRESSURE: 154 MMHG | RESPIRATION RATE: 18 BRPM | TEMPERATURE: 98 F | HEART RATE: 93 BPM | HEIGHT: 70 IN

## 2023-05-30 DIAGNOSIS — F10.120 ALCOHOL ABUSE WITH INTOXICATION, UNCOMPLICATED: ICD-10-CM

## 2023-05-30 PROCEDURE — 99284 EMERGENCY DEPT VISIT MOD MDM: CPT

## 2023-05-30 PROCEDURE — 73610 X-RAY EXAM OF ANKLE: CPT | Mod: 26,RT

## 2023-05-30 PROCEDURE — 73610 X-RAY EXAM OF ANKLE: CPT | Mod: RT

## 2023-05-30 PROCEDURE — 99285 EMERGENCY DEPT VISIT HI MDM: CPT | Mod: 25

## 2023-05-30 NOTE — ED PROVIDER NOTE - PROGRESS NOTE DETAILS
No signs of trauma awaiting patient's clinical sobriety ambulation trial and then likely planning for safe discharge patient currently clinically stable. The patient is clinically sober. The patient is alert and oriented x 3, is clear and coherent in conversation and has a normal gait and shows no signs of acute intoxication. The patient is safe for discharge. Quinn Gonzalez MD pt complains of ankle pain on ambulation at d/c, on eval w/ lateral swelling, will xray prior to dc.
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
## 2023-05-30 NOTE — ED PROVIDER NOTE - PATIENT PORTAL LINK FT
You can access the FollowMyHealth Patient Portal offered by [facility] by registering at the following website: [email or web address]. By joining [employer]’s FollowMyHealth portal, you will also be able to view your health information using other applications (apps) compatible with our system.

## 2023-05-30 NOTE — ED ADULT NURSE NOTE - OBJECTIVE STATEMENT
Pt presents to the ED by [employer]. Pt is visibly intoxicated at this time and is refusing to respond to assessment questions. Pt only grunts and pulls the blanket over his face. No signs of distress is noted at this time. Pt breathing is even and unlabored. Safety and comfort measures in place at this time.

## 2023-05-30 NOTE — ED ADULT NURSE NOTE - NSFALLRISKINTERV_ED_ALL_ED
Assistance OOB with selected safe patient handling equipment if applicable/Assistance with ambulation/Communicate fall risk and risk factors to all staff, patient, and family/Monitor gait and stability/Monitor for mental status changes and reorient to person, place, and time, as needed/Provide visual cue: yellow wristband, yellow gown, etc/Reinforce activity limits and safety measures with patient and family/Toileting schedule using arm’s reach rule for commode and bathroom/Use of alarms - bed, stretcher, chair and/or video monitoring/Call bell, personal items and telephone in reach/Instruct patient to call for assistance before getting out of bed/chair/stretcher/Non-slip footwear applied when patient is off stretcher/[place] to call system/Physically safe environment - no spills, clutter or unnecessary equipment/Purposeful Proactive Rounding/Room/bathroom lighting operational, light cord in reach

## 2023-05-30 NOTE — ED PROVIDER NOTE - CLINICAL SUMMARY MEDICAL DECISION MAKING FREE TEXT BOX
No signs of trauma awaiting patient's clinical sobriety ambulation trial and then likely planning for safe discharge patient currently clinically stable.

## 2023-05-30 NOTE — ED ADULT TRIAGE NOTE - NS ED NURSE BANDS TYPE
Chief complaint:   Chief Complaint   Patient presents with   • Gyn Exam       Vitals:  Visit Vitals  /90   Ht 5' 6\" (1.676 m)   Wt 11 kg (24 lb 4 oz)   BMI 3.91 kg/m²       HISTORY OF PRESENT ILLNESS     Patient comes into the clinic today for her female gyn exam   Patient's last Pap test was a year ago and that was normal   Patient has the same significant other she just moved in with her boyfriend they now live together for the past 2 weeks  She has no STD concerns   Patient works out [employer] full-time   Patient has a Nexplanon in for birth control with no menstrual cycle   She is very happy with that   Patient states she has 4 paternal aunts that all had breast cancer        Other significant problems:  Patient Active Problem List    Diagnosis Date Noted   • Gastroesophageal reflux disease 12/18/2020     Priority: Low   • Class 2 obesity with body mass index (BMI) of 37.0 to 37.9 in adult 12/18/2020     Priority: Low   • Nexplanon insertion 07/23/2019     Priority: Low     7/23/19 Nexplanon was removed and a new one inserted for another 3 year contraception     • Acanthosis nigricans 07/10/2018     Priority: Low   • Vitamin D insufficiency 01/23/2017     Priority: Low   • ADD (attention deficit disorder) 08/29/2014     Priority: Low   • General counseling for initiation of other contraceptive measures 04/03/2012     Priority: Low   • PCOS (polycystic ovarian syndrome) 02/13/2012     Priority: Low   • Hayfever 02/13/2012     Priority: Low       PAST MEDICAL, FAMILY AND SOCIAL HISTORY     Medications:  Current Outpatient Medications   Medication   • benzonatate (Tessalon Perles) 100 MG capsule   • fluticasone (FLONASE) 50 MCG/ACT nasal spray   • amphetamine-dextroamphetamine XR (Adderall XR) 15 MG 24 hr capsule   • amphetamine-dextroamphetamine XR (ADDERALL XR) 30 MG 24 hr capsule   • Multiple Vitamins-Minerals (MULTIVITAMIN ADULT PO)     No current facility-administered medications for this visit.        Allergies:  ALLERGIES:  No Known Allergies    Past Medical  History/Surgeries:  Past Medical History:   Diagnosis Date   • Chronic adenotonsillitis    • Hayfever    • Left knee injury     (01/2009) s/p knee surgery   • Nexplanon insertion 7/23/2019 7/23/19 Nexplanon was removed and a new one inserted for another 3 year contraception   • PCOS (polycystic ovarian syndrome)    • Pertussis    • Wears contact lenses        Past Surgical History:   Procedure Laterality Date   • Knee surgery     • Pap smear  05/13/2011   • Tonsillectomy and adenoidectomy  May 2012       Family History:  Family History   Problem Relation Age of Onset   • Diabetes Father    • Hypertension Father    • Cancer Maternal Grandmother         pancreatic   • Cancer Paternal Aunt         breast   • Cancer Paternal Aunt         breast   • Cancer Paternal Aunt         colon   • Diabetes Paternal Grandfather        Social History:  Social History     Tobacco Use   • Smoking status: Never Smoker   • Smokeless tobacco: Never Used   Substance Use Topics   • Alcohol use: Yes     Alcohol/week: 11.0 standard drinks     Types: 2 Cans of beer, 3 Shots of liquor, 6 Standard drinks or equivalent per week       REVIEW OF SYSTEMS     Review of Systems   Constitutional: Negative.    HENT: Negative.    Eyes: Negative.    Respiratory: Negative.    Cardiovascular: Negative.    Gastrointestinal: Negative.    Endocrine: Negative.    Genitourinary: Positive for menstrual problem.        Amenorrhea due to the Nexplanon   Musculoskeletal: Negative.    Skin: Negative.    Allergic/Immunologic: Negative.    Neurological: Negative.    Hematological: Negative.    Psychiatric/Behavioral: Negative.    All other systems reviewed and are negative.      PHYSICAL EXAM     Physical Exam  Vitals and nursing note reviewed.   Constitutional:       Appearance: Normal appearance.   HENT:      Head: Normocephalic and atraumatic.      Neck: Normal range of motion and neck supple.    Cardiovascular:      Rate and Rhythm: Normal rate and regular rhythm.      Pulses: Normal pulses.      Heart sounds: Normal heart sounds.   Pulmonary:      Effort: Pulmonary effort is normal.      Breath sounds: Normal breath sounds.   Genitourinary:     General: Normal vulva.      Rectum: Normal.      Comments: Speculum was inserted in the cervix was identified and closed.  Creamy white discharge was seen   No Pap is needed this year   Uterus:  Normal size, anteverted, mobile and nontender  Adnexa:  Negative to palpable masses and nontender   No cervical motion tenderness she  Musculoskeletal:         General: Normal range of motion.   Skin:     General: Skin is warm and dry.   Neurological:      General: No focal deficit present.      Mental Status: She is alert and oriented to person, place, and time.   Psychiatric:         Mood and Affect: Mood normal.         Behavior: Behavior normal.         Thought Content: Thought content normal.         Judgment: Judgment normal.       Breast examination was done with no palpable masses bilaterally.  No nipple discharge from either breast.  No skin indentation or abnormality seen to the skin on either breast  ASSESSMENT/PLAN     Gyn exam within normal limits   No Pap was needed this year   Nexplanon for birth control   Patient can call if she has any questions, problems or concerns otherwise return to clinic in 1 year   Name band;

## 2023-05-30 NOTE — ED ADULT TRIAGE NOTE - CHIEF COMPLAINT QUOTE
pt BIBA/SCPD c/o intox. pt found ambulating around [place], sleeping in peoples backyards. pt visibly intoxicated, endorses drinking 3L of beer. SCPD# 1384

## 2023-05-30 NOTE — ED PROVIDER NOTE - DISPOSITION TYPE
Include Z78.9 (Other Specified Conditions Influencing Health Status) As An Associated Diagnosis?: No Lot # (Optional): [id number] Expiration Date (Optional): APRIL 2019 X Size Of Lesion In Cm (Optional): 0 Detail Level: Detailed Kenalog Preparation: Kenalog Total Volume Injected (Ccs- Only Use Numbers And Decimals): 5.3 Concentration Of Solution Injected (Mg/Ml): 10.0 Medical Necessity Clause: This procedure was medically necessary because the lesions that were treated were: Consent: The risks of atrophy were reviewed with the patient. DISCHARGE

## 2023-05-30 NOTE — ED PROVIDER NOTE - OBJECTIVE STATEMENT
Airway  Urgency: elective    Airway not difficult    General Information and Staff    Patient location during procedure: OR  CRNA: ELIZABETH ESTRELLA    Indications and Patient Condition  Indications for airway management: airway protection    Preoxygenated: yes  MILS not maintained throughout  Mask difficulty assessment: 1 - vent by mask    Final Airway Details  Final airway type: endotracheal airway      Successful airway: ETT  Cuffed: yes   Successful intubation technique: direct laryngoscopy  Facilitating devices/methods: intubating stylet  Endotracheal tube insertion site: oral  Blade: Bagley  Blade size: #2  ETT size: 7.5 mm  Cormack-Lehane Classification: grade I - full view of glottis  Placement verified by: chest auscultation and capnometry   Measured from: lips  ETT to lips (cm): 22  Number of attempts at approach: 1              
Patient reports to ED after being found outside by [facility] [employer] wandering in the street sleeping in [facility].  Patient admits to EtOH abuse tonight.  He denies any   Other drug abuse.  No headache.  No chest pain or shortness of breath.  No abdominal pain.  He denies any drug use once again except the alcohol.  No suicidality or homicidality no other acute issues symptoms or concerns.

## 2023-08-15 NOTE — ED ADULT NURSE NOTE - CHIEF COMPLAINT
What Type Of Note Output Would You Prefer (Optional)?: Bullet Format How Severe Is Your Rash?: mild Is This A New Presentation, Or A Follow-Up?: Rash The patient is a 28y Male complaining of abdominal pain.

## 2023-10-26 ENCOUNTER — EMERGENCY (EMERGENCY)
Facility: HOSPITAL | Age: 33
LOS: 1 days | Discharge: HOME | End: 2023-10-26
Attending: EMERGENCY MEDICINE | Admitting: EMERGENCY MEDICINE
Payer: COMMERCIAL

## 2023-10-26 VITALS
OXYGEN SATURATION: 95 % | HEIGHT: 71 IN | SYSTOLIC BLOOD PRESSURE: 103 MMHG | RESPIRATION RATE: 15 BRPM | WEIGHT: 205.03 LBS | HEART RATE: 100 BPM | DIASTOLIC BLOOD PRESSURE: 63 MMHG | TEMPERATURE: 98 F

## 2023-10-26 VITALS
SYSTOLIC BLOOD PRESSURE: 106 MMHG | RESPIRATION RATE: 17 BRPM | OXYGEN SATURATION: 97 % | TEMPERATURE: 98 F | HEART RATE: 52 BPM | DIASTOLIC BLOOD PRESSURE: 62 MMHG

## 2023-10-26 DIAGNOSIS — Z90.49 ACQUIRED ABSENCE OF OTHER SPECIFIED PARTS OF DIGESTIVE TRACT: Chronic | ICD-10-CM

## 2023-10-26 PROCEDURE — 72110 X-RAY EXAM L-2 SPINE 4/>VWS: CPT | Mod: 26

## 2023-10-26 PROCEDURE — 72170 X-RAY EXAM OF PELVIS: CPT | Mod: 26

## 2023-10-26 PROCEDURE — 72170 X-RAY EXAM OF PELVIS: CPT

## 2023-10-26 PROCEDURE — 72110 X-RAY EXAM L-2 SPINE 4/>VWS: CPT

## 2023-10-26 PROCEDURE — 96372 THER/PROPH/DIAG INJ SC/IM: CPT

## 2023-10-26 PROCEDURE — 99284 EMERGENCY DEPT VISIT MOD MDM: CPT

## 2023-10-26 RX ORDER — IBUPROFEN 200 MG
1 TABLET ORAL
Qty: 30 | Refills: 0
Start: 2023-10-26

## 2023-10-26 RX ORDER — LIDOCAINE 4 G/100G
1 CREAM TOPICAL ONCE
Refills: 0 | Status: COMPLETED | OUTPATIENT
Start: 2023-10-26 | End: 2023-10-26

## 2023-10-26 RX ORDER — CYCLOBENZAPRINE HYDROCHLORIDE 10 MG/1
1 TABLET, FILM COATED ORAL
Qty: 15 | Refills: 0
Start: 2023-10-26 | End: 2023-10-30

## 2023-10-26 RX ORDER — CYCLOBENZAPRINE HYDROCHLORIDE 10 MG/1
10 TABLET, FILM COATED ORAL ONCE
Refills: 0 | Status: COMPLETED | OUTPATIENT
Start: 2023-10-26 | End: 2023-10-26

## 2023-10-26 RX ORDER — KETOROLAC TROMETHAMINE 30 MG/ML
60 SYRINGE (ML) INJECTION ONCE
Refills: 0 | Status: DISCONTINUED | OUTPATIENT
Start: 2023-10-26 | End: 2023-10-26

## 2023-10-26 RX ADMIN — LIDOCAINE 1 PATCH: 4 CREAM TOPICAL at 03:05

## 2023-10-26 RX ADMIN — Medication 60 MILLIGRAM(S): at 03:23

## 2023-10-26 RX ADMIN — CYCLOBENZAPRINE HYDROCHLORIDE 10 MILLIGRAM(S): 10 TABLET, FILM COATED ORAL at 03:22

## 2023-10-26 NOTE — ED PROVIDER NOTE - CARE PROVIDERS DIRECT ADDRESSES
,DirectAddress_Unknown ,[email or web address]

## 2023-10-26 NOTE — ED ADULT NURSE NOTE - PAIN RATING/NUMBER SCALE (0-10): ACTIVITY
Chief Complaint   Patient presents with   • Right Hip - Follow-up   • Left Hip - Follow-up   • Office Visit         DOS:  07/25/2022 - RIGHT LANG   07/09/2020 - LEFT LANG       HPI:    Ryan Laguna is a 85 Luis Carlos Rick M.D    Orthopedic Surgery  [facility]  P: [phone]  F: [phone]             8 (severe pain)

## 2023-10-26 NOTE — ED PROVIDER NOTE - PATIENT PORTAL LINK FT
You can access the FollowMyHealth Patient Portal offered by [facility] by registering at the following website: [email or web address]. By joining [email or web address]’s FollowMyHealth portal, you will also be able to view your health information using other applications (apps) compatible with our system.

## 2023-10-26 NOTE — ED PROVIDER NOTE - MUSCULOSKELETAL, MLM
Back +ttp left lumbar paraspinal muscles, +muscle spasm palpable on exam, mild lumbar spinal ttp, FROM throughout, gait hesitant

## 2023-10-26 NOTE — ED PROVIDER NOTE - NSFOLLOWUPINSTRUCTIONS_ED_ALL_ED_FT
Acute Back Pain, Adult  Acute back pain is sudden and usually short-lived. It is often caused by an injury to the muscles and tissues in the back. The injury may result from:  A muscle, tendon, or ligament getting overstretched or torn. Ligaments are tissues that connect bones to each other. Lifting something improperly can cause a back strain.  Wear and tear (degeneration) of the spinal disks. Spinal disks are circular tissue that provide cushioning between the bones of the spine (vertebrae).  Twisting motions, such as while playing sports or doing yard work.  A hit to the back.  Arthritis.  You may have a physical exam, lab tests, and imaging tests to find the cause of your pain. Acute back pain usually goes away with rest and home care.    Follow these instructions at home:  Managing pain, stiffness, and swelling    Take over-the-counter and prescription medicines only as told by your health care provider. Treatment may include medicines for pain and inflammation that are taken by mouth or applied to the skin, or muscle relaxants.  Your health care provider may recommend applying ice during the first 24–48 hours after your pain starts. To do this:  Put ice in a plastic bag.  Place a towel between your skin and the bag.  Leave the ice on for 20 minutes, 2–3 times a day.  Remove the ice if your skin turns bright red. This is very important. If you cannot feel pain, heat, or cold, you have a greater risk of damage to the area.  If directed, apply heat to the affected area as often as told by your health care provider. Use the heat source that your health care provider recommends, such as a moist heat pack or a heating pad.  Place a towel between your skin and the heat source.  Leave the heat on for 20–30 minutes.  Remove the heat if your skin turns bright red. This is especially important if you are unable to feel pain, heat, or cold. You have a greater risk of getting burned.  Activity    Comparisons of good and bad posture while driving, standing, sitting at a desk, and lifting heavy objects.  Do not stay in bed. Staying in bed for more than 1–2 days can delay your recovery.  Sit up and stand up straight. Avoid leaning forward when you sit or hunching over when you stand.  If you work at a desk, sit close to it so you do not need to lean over. Keep your chin tucked in. Keep your neck drawn back, and keep your elbows bent at a 90-degree angle (right angle).  Sit high and close to the steering wheel when you drive. Add lower back (lumbar) support to your car seat, if needed.  Take short walks on even surfaces as soon as you are able. Try to increase the length of time you walk each day.  Do not sit, drive, or  one place for more than 30 minutes at a time. Sitting or standing for long periods of time can put stress on your back.  Do not drive or use heavy machinery while taking prescription pain medicine.  Use proper lifting techniques. When you bend and lift, use positions that put less stress on your back:  Bend your knees.  Keep the load close to your body.  Avoid twisting.  Exercise regularly as told by your health care provider. Exercising helps your back heal faster and helps prevent back injuries by keeping muscles strong and flexible.  Work with a physical therapist to make a safe exercise program, as recommended by your health care provider. Do any exercises as told by your physical therapist.  Lifestyle    Maintain a healthy weight. Extra weight puts stress on your back and makes it difficult to have good posture.  Avoid activities or situations that make you feel anxious or stressed. Stress and anxiety increase muscle tension and can make back pain worse. Learn ways to manage anxiety and stress, such as through exercise.  General instructions    Sleep on a firm mattress in a comfortable position. Try lying on your side with your knees slightly bent. If you lie on your back, put a pillow under your knees.  Keep your head and neck in a straight line with your spine (neutral position) when using electronic equipment like smartphones or pads. To do this:  Raise your smartphone or pad to look at it instead of bending your head or neck to look down.  Put the smartphone or pad at the level of your face while looking at the screen.  Follow your treatment plan as told by your health care provider. This may include:  Cognitive or behavioral therapy.  Acupuncture or massage therapy.  Meditation or yoga.  Contact a health care provider if:  You have pain that is not relieved with rest or medicine.  You have increasing pain going down into your legs or buttocks.  Your pain does not improve after 2 weeks.  You have pain at night.  You lose weight without trying.  You have a fever or chills.  You develop nausea or vomiting.  You develop abdominal pain.  Get help right away if:  You develop new bowel or bladder control problems.  You have unusual weakness or numbness in your arms or legs.  You feel faint.  These symptoms may represent a serious problem that is an emergency. Do not wait to see if the symptoms will go away. Get medical help right away. Call your local emergency services (911 in the [place] Do not drive yourself to the hospital.    Summary  Acute back pain is sudden and usually short-lived.  Use proper lifting techniques. When you bend and lift, use positions that put less stress on your back.  Take over-the-counter and prescription medicines only as told by your health care provider, and apply heat or ice as told.  This information is not intended to replace advice given to you by your health care provider. Make sure you discuss any questions you have with your health care provider. Tremfya Counseling: I discussed with the patient the risks of guselkumab including but not limited to immunosuppression, serious infections, and drug reactions.  The patient understands that monitoring is required including a PPD at baseline and must alert us or the primary physician if symptoms of infection or other concerning signs are noted.

## 2023-10-26 NOTE — ED PROVIDER NOTE - CARE PROVIDER_API CALL
andrew,   Phone: (   )    -  Fax: (   )    -  Follow Up Time:    [facility]  Spine Surgery  [address], [address]  [place] [address]  Phone: [phone]  Fax: [phone]  Follow Up Time: 4-6 Days

## 2023-10-26 NOTE — ED ADULT NURSE NOTE - OBJECTIVE STATEMENT
pt to ED c/o pain to left side of his back that goes down his left leg. he reports he slipped and fell this past Sunday; has been taking Tylenol with minimal relief. appears uncomfortable. unable to sit in triage. ambulated with steady gait

## 2023-10-26 NOTE — ED ADULT NURSE NOTE - NSFALLRISKINTERV_ED_ALL_ED
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
## 2023-10-26 NOTE — ED ADULT NURSE NOTE - BEFAST LAST WELL KNOWN
Reason for Disposition  • ALL OTHER POISONOUS SUBSTANCES (e.g., most drugs, plants and chemicals)(Exception: Harmless substances or harmless overdose such as double or triple dose of OTC drug or antibiotic)    Protocols used: POISONING-P-AH      Transferred call to Poison Control    Unknown

## 2023-10-26 NOTE — ED PROVIDER NOTE - PROVIDER TOKENS
FREE:[LAST:[andrew],PHONE:[(   )    -],FAX:[(   )    -]] PROVIDER:[TOKEN:[[id number]],FOLLOWUP:[4-6 Days]]

## 2023-10-26 NOTE — ED PROVIDER NOTE - PROGRESS NOTE DETAILS
feels better, but drove here and is tired now, will wait until safe to drive home feels ready to go feels ready to go, moving more easily pt being discharged, motrin 800 and flexeril sent, now states his pain is worse again and needs morphine, states he always gets morphine at [facility] and will go there now for it

## 2023-10-26 NOTE — ED ADULT NURSE REASSESSMENT NOTE - NS ED NURSE REASSESS COMMENT FT1
pt slept for several hours. was able to stand and walk. d/c to self. instructed to follow up with spine specialist if he continues to have back pain. pt reported he came here for an MRI. explained to pt he will need to follow up with spine doctor or PCP. discussed pain medications with patient. d/c to self

## 2023-10-31 PROBLEM — Z00.00 ENCOUNTER FOR PREVENTIVE HEALTH EXAMINATION: Status: ACTIVE | Noted: 2023-10-31

## 2023-11-03 ENCOUNTER — APPOINTMENT (OUTPATIENT)
Dept: NEUROSURGERY | Facility: CLINIC | Age: 33
End: 2023-11-03

## 2023-12-21 ENCOUNTER — EMERGENCY (EMERGENCY)
Facility: HOSPITAL | Age: 33
LOS: 0 days | Discharge: HOME | End: 2023-12-22
Attending: EMERGENCY MEDICINE
Payer: MEDICAID

## 2023-12-21 VITALS — HEIGHT: 71 IN | WEIGHT: 195.11 LBS

## 2023-12-21 DIAGNOSIS — R00.0 TACHYCARDIA, UNSPECIFIED: ICD-10-CM

## 2023-12-21 DIAGNOSIS — Z90.49 ACQUIRED ABSENCE OF OTHER SPECIFIED PARTS OF DIGESTIVE TRACT: Chronic | ICD-10-CM

## 2023-12-21 DIAGNOSIS — R51.9 HEADACHE, UNSPECIFIED: ICD-10-CM

## 2023-12-21 DIAGNOSIS — R09.81 NASAL CONGESTION: ICD-10-CM

## 2023-12-21 DIAGNOSIS — R05.9 COUGH, UNSPECIFIED: ICD-10-CM

## 2023-12-21 DIAGNOSIS — Z20.822 CONTACT WITH AND (SUSPECTED) EXPOSURE TO COVID-19: ICD-10-CM

## 2023-12-21 DIAGNOSIS — J06.9 ACUTE UPPER RESPIRATORY INFECTION, UNSPECIFIED: ICD-10-CM

## 2023-12-21 DIAGNOSIS — B97.89 OTHER VIRAL AGENTS AS THE CAUSE OF DISEASES CLASSIFIED ELSEWHERE: ICD-10-CM

## 2023-12-21 PROCEDURE — 99283 EMERGENCY DEPT VISIT LOW MDM: CPT

## 2023-12-21 PROCEDURE — 99284 EMERGENCY DEPT VISIT MOD MDM: CPT

## 2023-12-21 PROCEDURE — 0241U: CPT

## 2023-12-21 RX ORDER — IBUPROFEN 200 MG
600 TABLET ORAL ONCE
Refills: 0 | Status: DISCONTINUED | OUTPATIENT
Start: 2023-12-21 | End: 2023-12-21

## 2023-12-21 RX ORDER — ONDANSETRON 8 MG/1
1 TABLET, FILM COATED ORAL
Qty: 12 | Refills: 0
Start: 2023-12-21 | End: 2023-12-24

## 2023-12-21 RX ORDER — IBUPROFEN 200 MG
1 TABLET ORAL
Qty: 30 | Refills: 0
Start: 2023-12-21

## 2023-12-21 NOTE — ED STATDOCS - PHYSICAL EXAMINATION
CONSTITUTIONAL: Well appearing, awake, alert, oriented to person, place, time/situation and in no apparent distress.  · ENMT: Airway patent, Nasal mucosa clear. Mouth with normal mucosa. Throat has   erythema but no vesicles, no oropharyngeal exudates and uvula is midline.  · EYES: Clear bilaterally, pupils equal, round and reactive to light.  · CARDIAC: tachycardia,, regular rhythm.  Heart sounds S1, S2.  No murmurs, rubs or gallops.  · RESPIRATORY: Breath sounds clear and equal bilaterally.  · MUSCULOSKELETAL: Spine appears normal, range of motion is not limited, no muscle or joint tenderness  · NEUROLOGICAL: Alert and oriented, no focal deficits, no motor or sensory deficits.  · SKIN: Skin normal color for race, warm, dry and intact. No evidence of rash

## 2023-12-21 NOTE — ED STATDOCS - PATIENT PORTAL LINK FT
You can access the FollowMyHealth Patient Portal offered by [facility] by registering at the following website: [email or web address]. By joining [email or web address]’s FollowMyHealth portal, you will also be able to view your health information using other applications (apps) compatible with our system. You can access the FollowMyHealth Patient Portal offered by [facility] by registering at the following website: [email or web address]. By joining [email or web address]’s FollowMyHealth portal, you will also be able to view your health information using other applications (apps) compatible with our system.

## 2023-12-21 NOTE — ED ADULT TRIAGE NOTE - CHIEF COMPLAINT QUOTE
Pt presents to the ED c/o flu-like symptoms. Pt reports fevers, chills, cough, sore throat, headache and nausea today. Pt reports taking 400mg ibuprofen 2 hrs ago.

## 2023-12-21 NOTE — ED STATDOCS - OBJECTIVE STATEMENT
33-year-old male no pertinent past medical history presents for evaluation of cough, congestion, body aches and headache that started at approximately 4 AM this morning.  The patient notes that he has taken acetaminophen and NyQuil with some relief.  The patient is currently in no apparent distress.  Patient denies any shortness of breath or chest pain.  The patient denies any recent travel.

## 2023-12-21 NOTE — ED STATDOCS - CLINICAL SUMMARY MEDICAL DECISION MAKING FREE TEXT BOX
patient's presentation is most consistent with viral URI.  Will get flu/COVID/RSV PCR and advised patient to continue treating supportively with antipyretics and p.o. fluids.

## 2023-12-21 NOTE — ED STATDOCS - NSFOLLOWUPINSTRUCTIONS_ED_ALL_ED_FT
Infección de las vías respiratorias superiores, en adultos  Upper Respiratory Infection, Adult    Adrian infección de las vías respiratorias superiores (IVRS) es adrian infección viral común de la nariz, garganta y de las vías respiratorias superiores que conducen el aire a los pulmones. El tipo más común de IVRS es el resfrío común. Las IVRS generalmente mejoran solas, sin tratamiento médico.    ¿Cuáles son las causas?  La causa es un virus. Se puede contagiar candida virus:    Al aspirar las gotitas que adrian persona infectada elimina al toser o estornudar.  Al tocar algo que estuvo expuesto al virus (fue contaminado) y luego tocarse la boca, nariz u ojos.    ¿Qué incrementa el riesgo?  Es más propenso a contraer adrian IVRS si:    Es muy pequeño o de edad muy avanzada.  Es el otoño o el invierno.  Tiene contacto cercano con otros, effie en adrian guardería, escuela o centro de atención médica.  Fuma.  Tiene adrian enfermedad cardíaca o pulmonar a violetta plazo (crónica).  Tiene debilitado el sistema que combate las enfermedades (inmunitario).  Tiene asma o alergias nasales.  Está sufriendo mucho estrés.  Trabaja en adrian área con rubina circulación de aire.  Tienen un déficit nutricional.    ¿Cuáles son los signos o los síntomas?  La IVRS suele presentar alguno de los siguientes síntomas:    Secreción o congestión nasal.  Estornudos.  Tos.  Dolor de garganta.  Dolor de melony.  Fatiga.  Fiebre.  Pérdida del apetito.  Dolor en la frente, detrás de los ojos y por encima de los pómulos (dolor sinusal).  Ching musculares.  Enrojecimiento o irritación de los ojos.  Presión en los oídos o la suhail.    ¿Cómo se diagnostica?  Esta afección se puede diagnosticar en función de los antecedentes médicos, los síntomas y un examen físico. El médico puede usar un hisopo para cecilio adrian muestra de mucosidad de la nariz (hisopado nasal). Esta muestra puede analizarse para determinar qué virus está provocando la enfermedad.    ¿Cómo se trata?  Las IVRS generalmente mejoran por sí solas en un período de entre 7 y 10 días. Puede cecilio medidas en wasserman casa para aliviar los síntomas. Los medicamentos no curan las IVRS, claudine el médico puede recomendarle ciertos medicamentos para ayudar a aliviar los síntomas, effie por ejemplo:    Medicamentos para la tos de venta damari.  Antitusivos. Toser es un tipo de defensa contra adrian infección que ayuda a limpiar el sistema respiratorio, de modo que tome estos medicamentos solo según se lo recomiende el médico.  Medicamentos para bajar la fiebre.    Siga estas indicaciones en wsaserman casa:      Actividad    Descanse todo lo que sea necesario.  Si tiene fiebre, quédese en wasserman casa, es decir, no vaya al trabajo o la escuela, hasta que no tenga fiebre o hasta que el médico le diga que ya no contagia. El médico puede pedirle que use adrian máscara facial para evitar que disemine la infección.        Para aliviar los síntomas    Rogerio gárgaras con adrian mezcla de agua y sal 3 o 4 veces al día, o cuando sea necesario. Para preparar la mezcla de agua y sal, disuelva totalmente de media a 1 cucharadita de sal en 1 taza de agua tibia.  Use un humidificador de aire frío para agregar humedad al aire. [place] puede ayudarlo a que respire mejor.        Qué debe comer y beber     Estela suficiente líquido effie para mantener la orina de color amarillo pálido.  [place] sopas y caldos transparentes.        Instrucciones generales     [place] los medicamentos de venta damari y los recetados solamente effie se lo haya indicado el médico. Estos incluyen medicamentos para el resfrío, para bajar la fiebre y antitusivos.  No consuma ningún producto que contenga nicotina o tabaco, effie cigarrillos y cigarrillos electrónicos. Si necesita ayuda para dejar de fumar, consulte al médico.   Aléjese del humo de otros fumadores.  Manténgase al día con todas las inmunizaciones, incluso la vacuna anual (adrian vez al año) contra la gripe.  Concurra a todas las visitas de seguimiento effie se lo haya indicado el médico. [place] es importante.        Cómo evitar contagiar la infección a otros     Las IVRS se transmiten de adrian persona a otra (son contagiosas). Para evitar que la infección se propague, tome las siguientes medidas:    Lávese las isabella frecuentemente con agua y jabón. Use desinfectante para isabella si no dispone de agua y jabón.  Evite tocarse la boca, la suhail, los ojos o la nariz.  Tosa o estornude en un pañuelo de papel o en wasserman manga o codo en lugar de hacerlo en la mano o en el aire.    Comuníquese con un médico si:  Empeora en lugar de mejorar.  Tiene fiebre o siente escalofríos.  Tiene mucosidad marrón o tang.  Tiene adrian secreción amarilla o marrón de la nariz.  Le duele la suhail, especialmente al inclinarse hacia adelante.  Tiene los ganglios del [place] hinchados.  Siente dolor al tragar.  Tiene zonas paul en la parte de atrás de la garganta.    Solicite ayuda de inmediato si:  La falta de aire empeora.  Tiene síntomas intensos o persistentes de:    Dolor de melony.  Dolor de oído.  Dolor sinusal.  Dolor en el pecho.  Tiene enfermedad pulmonar crónica junto con cualquiera de estos síntomas:    Sibilancias.  Tos prolongada.  Tos con cinthya.  Cambio en la mucosidad habitual.  Presenta rigidez en el [place].  Tiene cambios en:    La visión.  La audición.  El razonamiento.  El estado de ánimo.    Resumen  Adrian infección de las vías respiratorias superiores (IVRS) es adrian infección común de la nariz, garganta y de las vías respiratorias superiores que conducen el aire a los pulmones.  La causa es un virus.  Las IVRS generalmente mejoran por sí solas en un período de entre 7 y 10 días.  Los medicamentos no curan las IVRS, claudine el médico puede recomendarle ciertos medicamentos para ayudar a aliviar los síntomas.    NOTAS ADICIONALES E INSTRUCCIONES    Please follow up with your Primary MD in 24-48 hr.  Seek immediate medical care for any new/worsening signs or symptoms. Infección de las vías respiratorias superiores, en adultos  Upper Respiratory Infection, Adult    Adrian infección de las vías respiratorias superiores (IVRS) es adrian infección viral común de la nariz, garganta y de las vías respiratorias superiores que conducen el aire a los pulmones. El tipo más común de IVRS es el resfrío común. Las IVRS generalmente mejoran solas, sin tratamiento médico.    ¿Cuáles son las causas?  La causa es un virus. Se puede contagiar candida virus:    Al aspirar las gotitas que adrian persona infectada elimina al toser o estornudar.  Al tocar algo que estuvo expuesto al virus (fue contaminado) y luego tocarse la boca, nariz u ojos.    ¿Qué incrementa el riesgo?  Es más propenso a contraer adrian IVRS si:    Es muy pequeño o de edad muy avanzada.  Es el otoño o el invierno.  Tiene contacto cercano con otros, effie en adrian guardería, escuela o centro de atención médica.  Fuma.  Tiene adrian enfermedad cardíaca o pulmonar a violetta plazo (crónica).  Tiene debilitado el sistema que combate las enfermedades (inmunitario).  Tiene asma o alergias nasales.  Está sufriendo mucho estrés.  Trabaja en adrian área con rubina circulación de aire.  Tienen un déficit nutricional.    ¿Cuáles son los signos o los síntomas?  La IVRS suele presentar alguno de los siguientes síntomas:    Secreción o congestión nasal.  Estornudos.  Tos.  Dolor de garganta.  Dolor de melony.  Fatiga.  Fiebre.  Pérdida del apetito.  Dolor en la frente, detrás de los ojos y por encima de los pómulos (dolor sinusal).  Ching musculares.  Enrojecimiento o irritación de los ojos.  Presión en los oídos o la suhail.    ¿Cómo se diagnostica?  Esta afección se puede diagnosticar en función de los antecedentes médicos, los síntomas y un examen físico. El médico puede usar un hisopo para cecilio adrian muestra de mucosidad de la nariz (hisopado nasal). Esta muestra puede analizarse para determinar qué virus está provocando la enfermedad.    ¿Cómo se trata?  Las IVRS generalmente mejoran por sí solas en un período de entre 7 y 10 días. Puede cecilio medidas en wasserman casa para aliviar los síntomas. Los medicamentos no curan las IVRS, claudine el médico puede recomendarle ciertos medicamentos para ayudar a aliviar los síntomas, effie por ejemplo:    Medicamentos para la tos de venta damari.  Antitusivos. Toser es un tipo de defensa contra adrian infección que ayuda a limpiar el sistema respiratorio, de modo que tome estos medicamentos solo según se lo recomiende el médico.  Medicamentos para bajar la fiebre.    Siga estas indicaciones en wasserman casa:      Actividad    Descanse todo lo que sea necesario.  Si tiene fiebre, quédese en wasserman casa, es decir, no vaya al trabajo o la escuela, hasta que no tenga fiebre o hasta que el médico le diga que ya no contagia. El médico puede pedirle que use adrian máscara facial para evitar que disemine la infección.        Para aliviar los síntomas    Rogerio gárgaras con adrian mezcla de agua y sal 3 o 4 veces al día, o cuando sea necesario. Para preparar la mezcla de agua y sal, disuelva totalmente de media a 1 cucharadita de sal en 1 taza de agua tibia.  Use un humidificador de aire frío para agregar humedad al aire. [place] puede ayudarlo a que respire mejor.        Qué debe comer y beber     Estela suficiente líquido effie para mantener la orina de color amarillo pálido.  [place] sopas y caldos transparentes.        Instrucciones generales     [place] los medicamentos de venta damari y los recetados solamente effie se lo haya indicado el médico. Estos incluyen medicamentos para el resfrío, para bajar la fiebre y antitusivos.  No consuma ningún producto que contenga nicotina o tabaco, effie cigarrillos y cigarrillos electrónicos. Si necesita ayuda para dejar de fumar, consulte al médico.   Aléjese del humo de otros fumadores.  Manténgase al día con todas las inmunizaciones, incluso la vacuna anual (adrian vez al año) contra la gripe.  Concurra a todas las visitas de seguimiento effie se lo haya indicado el médico. [place] es importante.        Cómo evitar contagiar la infección a otros     Las IVRS se transmiten de adrian persona a otra (son contagiosas). Para evitar que la infección se propague, tome las siguientes medidas:    Lávese las isabella frecuentemente con agua y jabón. Use desinfectante para isabella si no dispone de agua y jabón.  Evite tocarse la boca, la suhail, los ojos o la nariz.  Tosa o estornude en un pañuelo de papel o en wasserman manga o codo en lugar de hacerlo en la mano o en el aire.    Comuníquese con un médico si:  Empeora en lugar de mejorar.  Tiene fiebre o siente escalofríos.  Tiene mucosidad marrón o tang.  Tiene adrian secreción amarilla o marrón de la nariz.  Le duele la suhail, especialmente al inclinarse hacia adelante.  Tiene los ganglios del [place] hinchados.  Siente dolor al tragar.  Tiene zonas paul en la parte de atrás de la garganta.    Solicite ayuda de inmediato si:  La falta de aire empeora.  Tiene síntomas intensos o persistentes de:    Dolor de melony.  Dolor de oído.  Dolor sinusal.  Dolor en el pecho.  Tiene enfermedad pulmonar crónica junto con cualquiera de estos síntomas:    Sibilancias.  Tos prolongada.  Tos con cinthya.  Cambio en la mucosidad habitual.  Presenta rigidez en el [place].  Tiene cambios en:    La visión.  La audición.  El razonamiento.  El estado de ánimo.    Resumen  Adrian infección de las vías respiratorias superiores (IVRS) es adrian infección común de la nariz, garganta y de las vías respiratorias superiores que conducen el aire a los pulmones.  La causa es un virus.  Las IVRS generalmente mejoran por sí solas en un período de entre 7 y 10 días.  Los medicamentos no curan las IVRS, claudine el médico puede recomendarle ciertos medicamentos para ayudar a aliviar los síntomas.    NOTAS ADICIONALES E INSTRUCCIONES    Please follow up with your Primary MD in 24-48 hr.  Seek immediate medical care for any new/worsening signs or symptoms.

## 2023-12-22 VITALS
DIASTOLIC BLOOD PRESSURE: 80 MMHG | SYSTOLIC BLOOD PRESSURE: 127 MMHG | HEART RATE: 99 BPM | OXYGEN SATURATION: 100 % | TEMPERATURE: 99 F | RESPIRATION RATE: 18 BRPM

## 2023-12-22 PROBLEM — M54.30 SCIATICA, UNSPECIFIED SIDE: Chronic | Status: ACTIVE | Noted: 2023-10-26

## 2023-12-22 LAB
FLUAV AG NPH QL: DETECTED
FLUAV AG NPH QL: DETECTED
FLUBV AG NPH QL: SIGNIFICANT CHANGE UP
FLUBV AG NPH QL: SIGNIFICANT CHANGE UP
RSV RNA NPH QL NAA+NON-PROBE: SIGNIFICANT CHANGE UP
RSV RNA NPH QL NAA+NON-PROBE: SIGNIFICANT CHANGE UP
SARS-COV-2 RNA SPEC QL NAA+PROBE: SIGNIFICANT CHANGE UP
SARS-COV-2 RNA SPEC QL NAA+PROBE: SIGNIFICANT CHANGE UP

## 2023-12-22 NOTE — ED ADULT NURSE NOTE - NS ED NURSE DISCH DISPOSITION
Prior Authorization Approval    Authorization Effective Date: 7/4/2019  Authorization Expiration Date: 6/27/2020  Medication: Deferiprone (Ferriprox) pa approved  Approved Dose/Quantity: 60  Reference #:     Insurance Company: Medicare Limited Income NET ([place] NET) Program - Phone [phone] Fax [phone]  Expected CoPay:       CoPay Card Available: No    Foundation Assistance Needed: na  Which Pharmacy is filling the prescription (Not needed for infusion/clinic administered[facility] [place], [place]  Pharmacy Notified: No  Patient Notified: No     Discharged

## 2023-12-27 ENCOUNTER — EMERGENCY (EMERGENCY)
Facility: HOSPITAL | Age: 33
LOS: 0 days | Discharge: HOME | End: 2023-12-27
Attending: EMERGENCY MEDICINE
Payer: MEDICAID

## 2023-12-27 VITALS
HEIGHT: 71 IN | DIASTOLIC BLOOD PRESSURE: 119 MMHG | RESPIRATION RATE: 22 BRPM | HEART RATE: 137 BPM | TEMPERATURE: 99 F | SYSTOLIC BLOOD PRESSURE: 161 MMHG | OXYGEN SATURATION: 95 %

## 2023-12-27 VITALS
OXYGEN SATURATION: 100 % | SYSTOLIC BLOOD PRESSURE: 124 MMHG | DIASTOLIC BLOOD PRESSURE: 92 MMHG | HEART RATE: 108 BPM | RESPIRATION RATE: 19 BRPM

## 2023-12-27 DIAGNOSIS — Z90.49 ACQUIRED ABSENCE OF OTHER SPECIFIED PARTS OF DIGESTIVE TRACT: Chronic | ICD-10-CM

## 2023-12-27 DIAGNOSIS — R45.1 RESTLESSNESS AND AGITATION: ICD-10-CM

## 2023-12-27 DIAGNOSIS — F10.929 ALCOHOL USE, UNSPECIFIED WITH INTOXICATION, UNSPECIFIED: ICD-10-CM

## 2023-12-27 PROCEDURE — 96375 TX/PRO/DX INJ NEW DRUG ADDON: CPT

## 2023-12-27 PROCEDURE — 99284 EMERGENCY DEPT VISIT MOD MDM: CPT

## 2023-12-27 PROCEDURE — 96376 TX/PRO/DX INJ SAME DRUG ADON: CPT

## 2023-12-27 PROCEDURE — 99285 EMERGENCY DEPT VISIT HI MDM: CPT | Mod: 25

## 2023-12-27 PROCEDURE — 96374 THER/PROPH/DIAG INJ IV PUSH: CPT

## 2023-12-27 RX ORDER — KETAMINE HYDROCHLORIDE 100 MG/ML
200 INJECTION INTRAMUSCULAR; INTRAVENOUS ONCE
Refills: 0 | Status: DISCONTINUED | OUTPATIENT
Start: 2023-12-27 | End: 2023-12-27

## 2023-12-27 RX ORDER — SODIUM CHLORIDE 9 MG/ML
2000 INJECTION INTRAMUSCULAR; INTRAVENOUS; SUBCUTANEOUS ONCE
Refills: 0 | Status: DISCONTINUED | OUTPATIENT
Start: 2023-12-27 | End: 2023-12-27

## 2023-12-27 RX ADMIN — Medication 4 MILLIGRAM(S): at 04:30

## 2023-12-27 RX ADMIN — KETAMINE HYDROCHLORIDE 200 MILLIGRAM(S): 100 INJECTION INTRAMUSCULAR; INTRAVENOUS at 03:05

## 2023-12-27 RX ADMIN — KETAMINE HYDROCHLORIDE 200 MILLIGRAM(S): 100 INJECTION INTRAMUSCULAR; INTRAVENOUS at 04:22
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
## 2023-12-27 NOTE — ED ADULT NURSE NOTE - NSFALLUNIVINTERV_ED_ALL_ED
Bed/Stretcher in lowest position, wheels locked, appropriate side rails in place/Call bell, personal items and telephone in reach/Instruct patient to call for assistance before getting out of bed/chair/stretcher/Non-slip footwear applied when patient is off stretcher/[place] to call system/Physically safe environment - no spills, clutter or unnecessary equipment/Purposeful proactive rounding/Room/bathroom lighting operational, light cord in reach Bed/Stretcher in lowest position, wheels locked, appropriate side rails in place/Call bell, personal items and telephone in reach/Instruct patient to call for assistance before getting out of bed/chair/stretcher/Non-slip footwear applied when patient is off stretcher/[place] to call system/Physically safe environment - no spills, clutter or unnecessary equipment/Purposeful proactive rounding/Room/bathroom lighting operational, light cord in reach
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
## 2023-12-27 NOTE — ED PROVIDER NOTE - CARE PLAN
1 Principal Discharge DX:	Agitation requiring sedation protocol  Secondary Diagnosis:	Alcohol intoxication
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
## 2023-12-27 NOTE — ED ADULT NURSE REASSESSMENT NOTE - NS ED NURSE REASSESS COMMENT FT1
Report received from KEAGAN Luna. Patient repositioned in stretcher. VS obtained and documented. 1:1 in place for safety.

## 2023-12-27 NOTE — ED ADULT TRIAGE NOTE - NS ED NURSE AMBULANCES
[facility]
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
## 2024-02-01 ENCOUNTER — TRANSCRIPTION ENCOUNTER (OUTPATIENT)
Age: 34
End: 2024-02-01

## 2024-06-10 NOTE — ED ADULT TRIAGE NOTE - NS ED NURSE BANDS TYPE
[Excellent] : ~his/her~  mood as  excellent [Yes] : Yes [Never (0 pts)] : Never (0 points) [No] : In the past 12 months have you used drugs other than those required for medical reasons? No [No falls in past year] : Patient reported no falls in the past year [Little interest or pleasure doing things] : 1) Little interest or pleasure doing things [Feeling down, depressed, or hopeless] : 2) Feeling down, depressed, or hopeless [0] : 2) Feeling down, depressed, or hopeless: Not at all (0) [Never] : Never [NO] : No [None] : None [Employed] : employed [Sexually Active] : sexually active [Feels Safe at Home] : Feels safe at home [Fully functional (bathing, dressing, toileting, transferring, walking, feeding)] : Fully functional (bathing, dressing, toileting, transferring, walking, feeding) [Fully functional (using the telephone, shopping, preparing meals, housekeeping, doing laundry, using] : Fully functional and needs no help or supervision to perform IADLs (using the telephone, shopping, preparing meals, housekeeping, doing laundry, using transportation, managing medications and managing finances) [Patient reported PAP Smear was normal] : Patient reported PAP Smear was normal [HIV test declined] : HIV test declined [Hepatitis C test declined] : Hepatitis C test declined [Alone] : lives alone [Change in mental status noted] : No change in mental status noted [Language] : denies difficulty with language [Behavior] : denies difficulty with behavior [Learning/Retaining New Information] : denies difficulty learning/retaining new information [Handling Complex Tasks] : denies difficulty handling complex tasks [Reasoning] : denies difficulty with reasoning [Spatial Ability and Orientation] : denies difficulty with spatial ability and orientation [High Risk Behavior] : no high risk behavior [Reports changes in hearing] : Reports no changes in hearing [Reports changes in vision] : Reports no changes in vision [Reports normal functional visual acuity (ie: able to read med bottle)] : Reports poor functional visual acuity.  [PapSmearDate] : 4/23 Name band;

## 2025-05-21 NOTE — ED PROVIDER NOTE - NSCAREINITIATED _GEN_ER
Start Metamucil powder every day.   Start dicyclomine (Bentyl) as needed.   14 day trial of lactose and gluten elimination diet.   Notify me in 8-12 weeks if symptoms have not improved and will plan for further evaluation.     
Mookie Mcgregor(Attending)